# Patient Record
Sex: FEMALE | Race: WHITE | NOT HISPANIC OR LATINO | Employment: OTHER | URBAN - METROPOLITAN AREA
[De-identification: names, ages, dates, MRNs, and addresses within clinical notes are randomized per-mention and may not be internally consistent; named-entity substitution may affect disease eponyms.]

---

## 2020-03-09 ENCOUNTER — TRANSFERRED RECORDS (OUTPATIENT)
Dept: HEALTH INFORMATION MANAGEMENT | Facility: CLINIC | Age: 50
End: 2020-03-09

## 2020-03-11 ENCOUNTER — TRANSFERRED RECORDS (OUTPATIENT)
Dept: HEALTH INFORMATION MANAGEMENT | Facility: CLINIC | Age: 50
End: 2020-03-11

## 2020-03-24 ENCOUNTER — TRANSFERRED RECORDS (OUTPATIENT)
Dept: HEALTH INFORMATION MANAGEMENT | Facility: CLINIC | Age: 50
End: 2020-03-24

## 2020-03-24 LAB
ALT SERPL-CCNC: 20 U/L (ref 7–40)
AST SERPL-CCNC: 23 U/L (ref 13–40)
CREAT SERPL-MCNC: 0.82 MG/DL (ref 0.5–1.2)
GFR SERPL CREATININE-BSD FRML MDRD: 83.2 ML/MIN/1.73M2
GLUCOSE SERPL-MCNC: 90 MG/DL (ref 73–126)
POTASSIUM SERPL-SCNC: 4.1 MMOL/L (ref 3.5–5.1)

## 2020-03-27 ENCOUNTER — TRANSFERRED RECORDS (OUTPATIENT)
Dept: HEALTH INFORMATION MANAGEMENT | Facility: CLINIC | Age: 50
End: 2020-03-27

## 2020-03-31 ENCOUNTER — TRANSFERRED RECORDS (OUTPATIENT)
Dept: HEALTH INFORMATION MANAGEMENT | Facility: CLINIC | Age: 50
End: 2020-03-31

## 2020-04-01 ENCOUNTER — TRANSFERRED RECORDS (OUTPATIENT)
Dept: HEALTH INFORMATION MANAGEMENT | Facility: CLINIC | Age: 50
End: 2020-04-01

## 2020-04-20 ENCOUNTER — PATIENT OUTREACH (OUTPATIENT)
Dept: RADIATION ONCOLOGY | Facility: CLINIC | Age: 50
End: 2020-04-20

## 2020-04-20 NOTE — PROGRESS NOTES
Patient contacted by this RN, screening for COVID-19 completed as patient scheduled for radiation oncology consultation with Dr. Ruth on Tuesday, 4/21/2020 at 1330.  Patient informed of visitor restrictions and patient verbalized understanding.  Reviewed appointment date, time, clinic location and check-in desk location.  Patient had no questions at this time    Marianne Briseno RN BSN OCN

## 2020-04-21 ENCOUNTER — APPOINTMENT (OUTPATIENT)
Dept: RADIATION ONCOLOGY | Facility: CLINIC | Age: 50
End: 2020-04-21
Payer: COMMERCIAL

## 2020-04-21 ENCOUNTER — OFFICE VISIT (OUTPATIENT)
Dept: RADIATION ONCOLOGY | Facility: CLINIC | Age: 50
End: 2020-04-21
Payer: COMMERCIAL

## 2020-04-21 VITALS
DIASTOLIC BLOOD PRESSURE: 87 MMHG | HEART RATE: 73 BPM | SYSTOLIC BLOOD PRESSURE: 126 MMHG | OXYGEN SATURATION: 98 % | WEIGHT: 134.4 LBS | TEMPERATURE: 98 F | RESPIRATION RATE: 18 BRPM

## 2020-04-21 DIAGNOSIS — C50.111 MALIGNANT NEOPLASM OF CENTRAL PORTION OF RIGHT BREAST IN FEMALE, ESTROGEN RECEPTOR POSITIVE (H): Primary | ICD-10-CM

## 2020-04-21 DIAGNOSIS — Z17.0 MALIGNANT NEOPLASM OF CENTRAL PORTION OF RIGHT BREAST IN FEMALE, ESTROGEN RECEPTOR POSITIVE (H): Primary | ICD-10-CM

## 2020-04-21 PROCEDURE — 99205 OFFICE O/P NEW HI 60 MIN: CPT | Performed by: RADIOLOGY

## 2020-04-21 PROCEDURE — 77263 THER RADIOLOGY TX PLNG CPLX: CPT | Performed by: RADIOLOGY

## 2020-04-21 PROCEDURE — 77290 THER RAD SIMULAJ FIELD CPLX: CPT | Performed by: RADIOLOGY

## 2020-04-21 RX ORDER — TRAZODONE HYDROCHLORIDE 50 MG/1
50 TABLET, FILM COATED ORAL
COMMUNITY
Start: 2020-03-13

## 2020-04-21 RX ORDER — FLUOXETINE 20 MG/1
20 TABLET, FILM COATED ORAL DAILY
COMMUNITY
Start: 2020-03-13

## 2020-04-21 RX ORDER — TAMOXIFEN CITRATE 10 MG/1
TABLET ORAL DAILY
COMMUNITY
Start: 2020-04-04

## 2020-04-21 ASSESSMENT — PAIN SCALES - GENERAL: PAINLEVEL: MILD PAIN (2)

## 2020-04-21 NOTE — NURSING NOTE
INITIAL PATIENT ASSESSMENT      Diagnosis: Breast cancer (right breast)    Prior radiation therapy: None    Prior chemotherapy: None    Prior hormonal therapy: Patient reports she has had Mirena IUD for six years, she reports she is looking to have this removed as Dr. Arango has informed her that she is post-menopausal.  Patient reports history of oral birth control use for 22 years, denies history of HRT.      Patient reports she is currently taking Tamoxifen po daily as prescribed by Dr. Arango on 3/31/2020.    Pain Eval:  Current history of pain associated with this visit:   Intensity: 2/10  Current: discomfort  Location: right breast nerve pains with intermittent radiation of pain to left breast  Treatment: denies need for medication management    Psychosocial  Living arrangements: Lives at home in [place]  Fall Risk: independent  [facility] Falls Risk Screening Completed: Yes Result: Negative   referral needs: Not needed    Advanced Directive: No, patient reports she has information packet at home  Implantable Cardiac Device: No  Authorization To Share Protected Health Information: YES - Date: 4/21/2020    Onset of menarche: age 13  Onset of menopause: patient reports she currently has IUD, labs drawn per Dr. Arango and patient reports she was informed by Dr. Arango that she is post-menopausal by labs.  Abnormal vaginal bleeding/discharge: No  Are you pregnant? No  Reproductive note: Patient reports history of two pregnancies with two live births, first at age 28, denies history of breastfeeding.  Urine Pregnancy Testing Needed: No - reviewed with Dr. Ruth.      Review of Systems     Constitutional: Positive for diaphoresis. Negative for chills, fever, malaise/fatigue and weight loss.        Patient reports intermittent night sweats.   HENT: Negative.    Eyes: Negative.    Respiratory: Negative.    Cardiovascular: Negative.    Gastrointestinal: Negative.    Genitourinary: Negative.    Musculoskeletal:  Negative.    Skin: Negative.    Neurological: Negative.    Endo/Heme/Allergies: Negative.    Psychiatric/Behavioral: Negative.      Nurse face-to-face time: Level 5:  over 15 min face to face time

## 2020-04-21 NOTE — PROGRESS NOTES
"Problem: Patient Care Overview  Goal: Plan of Care/Patient Progress Review    Pt is calm with mildly bland affect. She states her mood is \"kind of in between-neutral.\" She denies si; rates her depression 4, anxiety 2. She said she is \"having to contain my energy,\" when talked about manic sx. She reports she is having \"a little bit\" of racing thoughts. Pt states her goal for the day is \"to go to all groups and get my assignments done.\" She said she has been \"pretty social\" w peers.      " Dear Colleagues,  Today Cece Roth was seen in consultation.  IDENTIFICATION: This is a 49 year old postmenopausal woman with right breast cancer status post lumpectomy and SLNBx, revealing microinvasive IDCA, [id number], ER+/RI+/H2N+, disease referred for adjuvant radiation therapy. She will not be receiving chemotherapy.  HISTORY OF PRESENT ILLNESS: Cece Roth was in her usual state of health this past year.  On March 11, 2020 bilateral screening mammogram showed within the right breast 12 o'clock position indeterminate microcalcifications spanning 4 cm.  There was no suspicious finding on the left.  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  On March 27, 2020 Dr. John took Ms. Roth to the OR for a right breast wire-localized lumpectomy and sentinel lymph node biopsy.  Pathology revealed 2.4 cm of grade 3 DCIS and no invasive malignancy. Initially the anterior margin was 1 mm but this was re-excised and the new anterior medial margin revealed no atypia in situ or invasive carcinoma. Noninvasive margins were adequate. Invasive margins were indeterminate as it was only present in biopsy specimen.  There was 0 out of 1 positive lymph node removed.  Specimen radiograph was completed. The specimen contained the distal localization wires and the calcifications. The calcifications were at the edge of the specimen therefore slicing was performed. Calcifications were seen within specimen 3 (12-1 o'clock) and specimen 4 (11-12 o'clock). The clip was not in the specimen, however was displaced from the site of biopsy. Her postoperative course has been unenventful.  Dr. Arango is her Medical Oncologist and she was initially seen prior to surgery on March 24, 2020.  After surgery she was started on Tamoxifen on March 31, 2020.  Since her  surgery, she has continued to heal well and denies any significant pain requiring pain medication.  She has good ROM.  She denies any other new masses, skin changes, shortness of breath, chest or bony pain, or new neurologic symptoms. She is being seen here today for consideration of postoperative radiotherapy.  REVIEW OF SYSTEMS: As per HPI, a 14-point review of system is otherwise negative.  PAST RADIATION THERAPY:  Denies.  PAST CTD/PACEMAKER: Denies  BREAST RISK FACTORS:  No history of prior breast biopsy. No family history of breast or ovarian cancer. She started her menses at age 13.   with her first delivery at age 28. She  for a total of 0 months. Postmenopausal as of this month with lab draws on file.  History of IUD x 6 years.  History of OCP use x 22 years.    Past Medical History:   Diagnosis Date     Breast cancer (H) 2020    Right Breast       Past Surgical History:   Procedure Laterality Date     BREAST BIOPSY, CORE RT/LT Right 2020     LUMPECTOMY BREAST WITH SENTINEL NODE, COMBINED Right 2020    Dr. John       Family History   Problem Relation Age of Onset     Breast Cancer No family hx of      Ovarian Cancer No family hx of        Social History     Tobacco Use     Smoking status: Never Smoker     Smokeless tobacco: Never Used   Substance Use Topics     Alcohol use: Yes     Comment: Two glasses of wine per day per patient report     Current Outpatient Medications   Medication     FLUoxetine 20 MG tablet     tamoxifen (NOLVADEX) 10 MG tablet     traZODone (DESYREL) 50 MG tablet     No current facility-administered medications for this visit.       No Known Allergies  PHYSICAL EXAMINATION:  /87 (BP Location: Left arm, Patient Position: Chair, Cuff Size: Adult Regular)   Pulse 73   Temp 98  F (36.7  C) (Oral)   Resp 18   Wt 61 kg (134 lb 6.4 oz)   SpO2 98%   GENERAL Well-appearing woman in no acute distress.  HEENT Normocephalic, atraumatic.  Sclerae  anicteric.  CVR  Regular rate and rhythm.  No murmurs, rubs, or gallops.  LUNGS Clear to auscultation bilaterally.  BREASTS Breasts are examined in the supine and upright position.  The breasts are symmetric.  The left breast is unremarkable, as there is no other erythema, ulceration or suspicious nodularity within it.  Within the right upper breast and right axilla there are two well healed incisions.  Firmness of these incisions is not suspicious.  There is no suspicious erythema, ulceration or nodularity within the right breast.  There is no erythema, retraction, desquamation or discharge appreciated within the right or left nipple areolar complex.    LYMPH No supraclavicular, infraclavicular, or axillary lymphadenopathy appreciated bilaterally.  ABDOMEN Soft.    EXT  No clubbing or cyanosis or edema.    NEURO No focal deficits.  MSK  Good ROM. No spinal tenderness.  SKIN  Warm and well perfused.    PSYCH  Alert and oriented x 3    ECOG PERFORMANCE STATUS: 0    IMPRESSION/PLAN: Cece Roth is a 49 year old postmenopausal woman with right breast cancer status post lumpectomy and SLNBx, revealing microinvasive IDCA, [id number], ER+/NM+/H2N+, disease referred for adjuvant radiation therapy. She will not be receiving chemotherapy.  I recommend adjuvant XRT to improve local control and overall survival.  Plan is to treat the affected breast based on multiple randomized prospective data which show decrease risk of local recurrence by ~50% with the addition of XRT to BCS and the EBCTCG metaanalysis published in Lancet 2011 which shows that for every 4 recurrences avoided by year 10 one breast cancer death is avoided by year 15.  Given her age she will also receive a tumor cavity boost based EORTC and [employer] Boost Trials.  The risks, benefits, treatment rationale and regimen of radiation therapy to the breast were discussed in great detail today with the patient.  Risks include but are not limited to skin  "erythema, desquamation, hyperpigmentation, breast and lymphedema, fibrosis, telengectasia, pneumonitis, cardiac toxicity, rib fracture and secondary malignancy. The patient consented to therapy and is scheduled for a CT simulation. Treatment will start in ~1 week.    Of note, today during her visit we also discussed an alternative treatment schedule/plan given the COVID pandemic.  This would entail delaying adjuvant XRT while on Tamoxifen, until the pandemic subsides. She is Her2+ but had microinvasion.  She also has G3 DCIS excised with adequate margins.  Patient expressed a great deal of anxiety today and stated that she would prefer to have her radiation treatment completed as soon as possible.  I think this is reasonable given her recurrence risk, lack of comorbidities and ambiguity of when COVID will be at a \"safe\" level within the community. We discussed taking precautions while under treatment.  She arrived today not wearing a mask but stated that she would get one before starting treatment.  She already has been very diligent about decreasing her exposure within the community.  Additional problem list to be addressed in the following manner:  1. Systemic Treatment in Postmenopausal Woman: No chemo.  Currently on Tamoxifen.  Will stop taking during radiation treatment and restart it 2 weeks after XRT completed.  Dr. Arango aware.   There was ample time for questions and all were answered to the patient's satisfaction. Thank you for allowing me to participate in the care of this pleasant patient. If you have any questions, please do not hesitate to contact my office.    Sincerely,  Courtney Ruth MD          "

## 2020-04-21 NOTE — PATIENT INSTRUCTIONS
Please contact [facility] RN with questions or concerns following today's appointment: [phone].    Thank you!

## 2020-04-23 ENCOUNTER — TRANSFERRED RECORDS (OUTPATIENT)
Dept: HEALTH INFORMATION MANAGEMENT | Facility: CLINIC | Age: 50
End: 2020-04-23

## 2020-04-24 ENCOUNTER — APPOINTMENT (OUTPATIENT)
Dept: RADIATION ONCOLOGY | Facility: CLINIC | Age: 50
End: 2020-04-24
Payer: COMMERCIAL

## 2020-04-24 PROCEDURE — 77334 RADIATION TREATMENT AID(S): CPT | Performed by: RADIOLOGY

## 2020-04-24 PROCEDURE — 77300 RADIATION THERAPY DOSE PLAN: CPT | Performed by: RADIOLOGY

## 2020-04-24 PROCEDURE — 77295 3-D RADIOTHERAPY PLAN: CPT | Performed by: RADIOLOGY

## 2020-04-28 ENCOUNTER — ANCILLARY PROCEDURE (OUTPATIENT)
Dept: MAMMOGRAPHY | Facility: CLINIC | Age: 50
End: 2020-04-28
Attending: RADIOLOGY
Payer: COMMERCIAL

## 2020-04-28 ENCOUNTER — PATIENT OUTREACH (OUTPATIENT)
Dept: RADIATION ONCOLOGY | Facility: CLINIC | Age: 50
End: 2020-04-28

## 2020-04-28 DIAGNOSIS — Z17.0 MALIGNANT NEOPLASM OF CENTRAL PORTION OF RIGHT BREAST IN FEMALE, ESTROGEN RECEPTOR POSITIVE (H): ICD-10-CM

## 2020-04-28 DIAGNOSIS — C50.111 MALIGNANT NEOPLASM OF CENTRAL PORTION OF RIGHT BREAST IN FEMALE, ESTROGEN RECEPTOR POSITIVE (H): ICD-10-CM

## 2020-04-28 PROCEDURE — 77065 DX MAMMO INCL CAD UNI: CPT | Performed by: RADIOLOGY

## 2020-04-28 PROCEDURE — G0279 TOMOSYNTHESIS, MAMMO: HCPCS | Performed by: RADIOLOGY

## 2020-04-28 NOTE — TELEPHONE ENCOUNTER
Patient had mammogram today, 4/28/2020 as ordered by Dr. Ruth.  Request from Dr. Ruth to notify patient of results and continue with radiation treatment start as planned.  This RN contacted patient, notified of above information and confirmed appointment for verification sim for radiation treatment tomorrow, 4/29/2020 at 1200.    Patient verbalized understanding of all information and had no questions at this time.    Marianne Briseno RN BSN OCN

## 2020-04-29 ENCOUNTER — APPOINTMENT (OUTPATIENT)
Dept: RADIATION ONCOLOGY | Facility: CLINIC | Age: 50
End: 2020-04-29
Payer: COMMERCIAL

## 2020-04-29 PROCEDURE — 77280 THER RAD SIMULAJ FIELD SMPL: CPT | Performed by: RADIOLOGY

## 2020-04-30 ENCOUNTER — APPOINTMENT (OUTPATIENT)
Dept: RADIATION ONCOLOGY | Facility: CLINIC | Age: 50
End: 2020-04-30
Payer: COMMERCIAL

## 2020-04-30 PROCEDURE — G6012 RADIATION TREATMENT DELIVERY: HCPCS | Performed by: RADIOLOGY

## 2020-05-01 ENCOUNTER — APPOINTMENT (OUTPATIENT)
Dept: RADIATION ONCOLOGY | Facility: CLINIC | Age: 50
End: 2020-05-01
Payer: COMMERCIAL

## 2020-05-01 PROCEDURE — 77417 THER RADIOLOGY PORT IMAGE(S): CPT | Performed by: RADIOLOGY

## 2020-05-01 PROCEDURE — G6012 RADIATION TREATMENT DELIVERY: HCPCS | Performed by: RADIOLOGY

## 2020-05-04 ENCOUNTER — APPOINTMENT (OUTPATIENT)
Dept: RADIATION ONCOLOGY | Facility: CLINIC | Age: 50
End: 2020-05-04
Payer: COMMERCIAL

## 2020-05-04 PROCEDURE — G6012 RADIATION TREATMENT DELIVERY: HCPCS | Performed by: RADIOLOGY

## 2020-05-05 ENCOUNTER — APPOINTMENT (OUTPATIENT)
Dept: RADIATION ONCOLOGY | Facility: CLINIC | Age: 50
End: 2020-05-05
Payer: COMMERCIAL

## 2020-05-05 PROCEDURE — G6012 RADIATION TREATMENT DELIVERY: HCPCS | Performed by: RADIOLOGY

## 2020-05-06 ENCOUNTER — OFFICE VISIT (OUTPATIENT)
Dept: RADIATION ONCOLOGY | Facility: CLINIC | Age: 50
End: 2020-05-06
Payer: COMMERCIAL

## 2020-05-06 ENCOUNTER — APPOINTMENT (OUTPATIENT)
Dept: RADIATION ONCOLOGY | Facility: CLINIC | Age: 50
End: 2020-05-06
Payer: COMMERCIAL

## 2020-05-06 VITALS
WEIGHT: 134 LBS | OXYGEN SATURATION: 98 % | SYSTOLIC BLOOD PRESSURE: 119 MMHG | TEMPERATURE: 97 F | DIASTOLIC BLOOD PRESSURE: 83 MMHG | HEART RATE: 68 BPM | RESPIRATION RATE: 18 BRPM

## 2020-05-06 DIAGNOSIS — C50.111 MALIGNANT NEOPLASM OF CENTRAL PORTION OF RIGHT BREAST IN FEMALE, ESTROGEN RECEPTOR POSITIVE (H): Primary | ICD-10-CM

## 2020-05-06 DIAGNOSIS — Z17.0 MALIGNANT NEOPLASM OF CENTRAL PORTION OF RIGHT BREAST IN FEMALE, ESTROGEN RECEPTOR POSITIVE (H): Primary | ICD-10-CM

## 2020-05-06 PROCEDURE — 99207 ZZC DROP WITH A PROCEDURE: CPT | Performed by: RADIOLOGY

## 2020-05-06 PROCEDURE — 77412 RADIATION TX DELIVERY LVL 3: CPT | Performed by: RADIOLOGY

## 2020-05-06 PROCEDURE — 77427 RADIATION TX MANAGEMENT X5: CPT | Performed by: RADIOLOGY

## 2020-05-06 PROCEDURE — 77336 RADIATION PHYSICS CONSULT: CPT | Performed by: RADIOLOGY

## 2020-05-06 ASSESSMENT — PAIN SCALES - GENERAL: PAINLEVEL: NO PAIN (0)

## 2020-05-06 NOTE — PATIENT INSTRUCTIONS
Please contact [facility] RN with questions or concerns following today's appointment: [phone].    Thank you!

## 2020-05-06 NOTE — NURSING NOTE
Teaching Flowsheet   Relevant Diagnosis: breast cancer  Teaching Topic: radiation therapy     Person(s) involved in teaching:   Patient     Motivation Level:  Asks Questions: Yes  Eager to Learn: Yes  Cooperative: Yes  Receptive (willing/able to accept information): Yes  Any cultural factors/Rastafari beliefs that may influence understanding or compliance? No       Patient demonstrates understanding of the following:  Reason for the appointment, diagnosis and treatment plan: Yes  Knowledge of proper use of medications and conditions for which they are ordered (with special attention to potential side effects or drug interactions): Yes  Which situations necessitate calling provider and whom to contact: Yes       Teaching Concerns Addressed:   Comments: Radiation therapy side effects: fatigue, skin changes and skin cares     Proper use and care of  (medical equip, care aids, etc.): NA  Nutritional needs and diet plan: Yes  Pain management techniques: Yes  Wound Care: Yes  How and/when to access community resources: Yes     Instructional Materials Used/Given: Radiation therapy educational handouts: fatigue, skin changes and skin cares     Time spent with patient: 15 minutes.

## 2020-05-06 NOTE — PROGRESS NOTES
[facility] PHYSICIANS  SPECIALIZING IN BREAKTHROUGHS  Radiation Oncology    On Treatment Visit Note      Cece Roth      Date: 2020   MRN: [id number]   : 1970  Diagnosis: breast cancer      Reason for Visit:  On Radiation Treatment Visit     Treatment Summary to Date  Treatment Site: right breast Current Dose: 1330/5256 cGy Fractions:       Chemotherapy  Chemo concurrent with radx?: No(Dr. Arango)    Subjective:     Early in treatment.  Doing well with no complaints.    Nursing ROS:   Nutrition Alteration  Diet Type: Patient's Preference  Skin  Skin Reaction: 0 - No changes  Skin Intervention: skin changes and skin cares reviewed, Aquaphor and Vanicream samples provided        Cardiovascular  Respiratory effort: 1 - Normal - without distress        Psychosocial  Mood - Anxiety: 0 - Normal  Mood - Depression: 0 - Normal  Pyschosocial Note: energy level at baseline  Pain Assessment  0-10 Pain Scale: 0      Objective:   /83 (BP Location: Left arm, Patient Position: Chair, Cuff Size: Adult Regular)   Pulse 68   Temp 97  F (36.1  C) (Oral)   Resp 18   Wt 60.8 kg (134 lb)   SpO2 98%   Gen: Appears well, in no acute distress  Skin: No erythema    Labs:  CBC RESULTS: No results for input(s): WBC, RBC, HGB, HCT, MCV, MCH, MCHC, RDW, PLT in the last 84209 hours.  ELECTROLYTES:  Recent Labs   Lab Test 20   POTASSIUM 4.1   CR 0.82   GLC 90       Assessment:    Tolerating radiation therapy well.  All questions and concerns addressed.    No toxicity    Plan:   1. Continue current therapy.    2. Skin care per above.      Mosaiq chart and setup information reviewed  Ports checked    Medication Review  Med list reviewed with patient?: Yes  Med list printed and given: Offered and declined    Educational Topic Discussed  Education Instructions: radiation therapy side effects: fatigue, skin changes and skin cares        Courtney Ruth MD    Please do not send letter to referring  physician.

## 2020-05-07 ENCOUNTER — APPOINTMENT (OUTPATIENT)
Dept: RADIATION ONCOLOGY | Facility: CLINIC | Age: 50
End: 2020-05-07
Payer: COMMERCIAL

## 2020-05-07 PROCEDURE — 77307 TELETHX ISODOSE PLAN CPLX: CPT | Performed by: RADIOLOGY

## 2020-05-07 PROCEDURE — 77334 RADIATION TREATMENT AID(S): CPT | Performed by: RADIOLOGY

## 2020-05-07 PROCEDURE — 77412 RADIATION TX DELIVERY LVL 3: CPT | Performed by: RADIOLOGY

## 2020-05-08 ENCOUNTER — APPOINTMENT (OUTPATIENT)
Dept: RADIATION ONCOLOGY | Facility: CLINIC | Age: 50
End: 2020-05-08
Payer: COMMERCIAL

## 2020-05-08 PROCEDURE — G6012 RADIATION TREATMENT DELIVERY: HCPCS | Performed by: RADIOLOGY

## 2020-05-08 PROCEDURE — 77417 THER RADIOLOGY PORT IMAGE(S): CPT | Performed by: RADIOLOGY

## 2020-05-11 ENCOUNTER — APPOINTMENT (OUTPATIENT)
Dept: RADIATION ONCOLOGY | Facility: CLINIC | Age: 50
End: 2020-05-11
Payer: COMMERCIAL

## 2020-05-11 PROCEDURE — G6012 RADIATION TREATMENT DELIVERY: HCPCS | Performed by: RADIOLOGY

## 2020-05-12 ENCOUNTER — APPOINTMENT (OUTPATIENT)
Dept: RADIATION ONCOLOGY | Facility: CLINIC | Age: 50
End: 2020-05-12
Payer: COMMERCIAL

## 2020-05-12 PROCEDURE — G6012 RADIATION TREATMENT DELIVERY: HCPCS | Performed by: RADIOLOGY

## 2020-05-13 ENCOUNTER — APPOINTMENT (OUTPATIENT)
Dept: RADIATION ONCOLOGY | Facility: CLINIC | Age: 50
End: 2020-05-13
Payer: COMMERCIAL

## 2020-05-13 ENCOUNTER — OFFICE VISIT (OUTPATIENT)
Dept: RADIATION ONCOLOGY | Facility: CLINIC | Age: 50
End: 2020-05-13
Payer: COMMERCIAL

## 2020-05-13 VITALS — RESPIRATION RATE: 16 BRPM | OXYGEN SATURATION: 97 % | TEMPERATURE: 97 F | HEART RATE: 83 BPM | WEIGHT: 134.2 LBS

## 2020-05-13 DIAGNOSIS — C50.111 MALIGNANT NEOPLASM OF CENTRAL PORTION OF RIGHT BREAST IN FEMALE, ESTROGEN RECEPTOR POSITIVE (H): Primary | ICD-10-CM

## 2020-05-13 DIAGNOSIS — Z17.0 MALIGNANT NEOPLASM OF CENTRAL PORTION OF RIGHT BREAST IN FEMALE, ESTROGEN RECEPTOR POSITIVE (H): Primary | ICD-10-CM

## 2020-05-13 PROCEDURE — 77412 RADIATION TX DELIVERY LVL 3: CPT | Performed by: RADIOLOGY

## 2020-05-13 PROCEDURE — 77427 RADIATION TX MANAGEMENT X5: CPT | Performed by: RADIOLOGY

## 2020-05-13 PROCEDURE — 77336 RADIATION PHYSICS CONSULT: CPT | Performed by: RADIOLOGY

## 2020-05-13 PROCEDURE — 99207 ZZC DROP WITH A PROCEDURE: CPT | Performed by: RADIOLOGY

## 2020-05-13 ASSESSMENT — PAIN SCALES - GENERAL: PAINLEVEL: NO PAIN (0)

## 2020-05-13 NOTE — PROGRESS NOTES
[facility] PHYSICIANS  SPECIALIZING IN BREAKTHROUGHS  Radiation Oncology    On Treatment Visit Note      Cece Roth      Date: 2020   MRN: [id number]   : 1970  Diagnosis: breast cancer      Reason for Visit:  On Radiation Treatment Visit     Treatment Summary to Date  Treatment Site: right breast Current Dose: 2660/5256 cGy Fractions: 10/20      Chemotherapy  Chemo concurrent with radx?: No(Dr. Arango)    Subjective:     More skin redness this week.    Nursing ROS:   Nutrition Alteration  Diet Type: Patient's Preference  Skin  Skin Reaction: 1 - Faint erythema or dry desquamation(no desquamation)  Skin Intervention: patient using Vanicream and Aquaphor every other day  Skin Note: reviewed daily skin cares - recommend use of Aquaphor or Vanicream two times daily        Cardiovascular  Respiratory effort: 1 - Normal - without distress        Psychosocial  Mood - Anxiety: 0 - Normal  Mood - Depression: 0 - Normal  Pyschosocial Note: energy level at baseline  Pain Assessment  0-10 Pain Scale: 0      Objective:   Pulse 83   Temp 97  F (36.1  C) (Oral)   Resp 16   Wt 60.9 kg (134 lb 3.2 oz)   SpO2 97%   Gen: Appears well, in no acute distress  Skin: Mild diffuse erythema over treatment field    Labs:  CBC RESULTS: No results for input(s): WBC, RBC, HGB, HCT, MCV, MCH, MCHC, RDW, PLT in the last 66736 hours.  ELECTROLYTES:  Recent Labs   Lab Test 20   POTASSIUM 4.1   CR 0.82   GLC 90       Assessment:    Tolerating radiation therapy well.  All questions and concerns addressed.    Toxicities:  Fatigue: Grade 0: No toxicity  Dermatitis: Grade 1: Faint erythema or dry desquamation    Plan:   1. Continue current therapy.    2. Skin care per above.      Mosaiq chart and setup information reviewed  Ports checked    Medication Review  Med list reviewed with patient?: Yes  Med list printed and given: Offered and declined    Educational Topic Discussed  Education Instructions: reviewed  continued skin cares        Courtney Ruth MD    Please do not send letter to referring physician.

## 2020-05-13 NOTE — PATIENT INSTRUCTIONS
Please contact [facility] RN with questions or concerns following today's appointment: [phone].    Thank you!

## 2020-05-14 ENCOUNTER — APPOINTMENT (OUTPATIENT)
Dept: RADIATION ONCOLOGY | Facility: CLINIC | Age: 50
End: 2020-05-14
Payer: COMMERCIAL

## 2020-05-14 PROCEDURE — G6012 RADIATION TREATMENT DELIVERY: HCPCS | Performed by: RADIOLOGY

## 2020-05-15 ENCOUNTER — APPOINTMENT (OUTPATIENT)
Dept: RADIATION ONCOLOGY | Facility: CLINIC | Age: 50
End: 2020-05-15
Payer: COMMERCIAL

## 2020-05-15 PROCEDURE — G6012 RADIATION TREATMENT DELIVERY: HCPCS | Performed by: RADIOLOGY

## 2020-05-15 PROCEDURE — 77417 THER RADIOLOGY PORT IMAGE(S): CPT | Performed by: RADIOLOGY

## 2020-05-18 ENCOUNTER — APPOINTMENT (OUTPATIENT)
Dept: RADIATION ONCOLOGY | Facility: CLINIC | Age: 50
End: 2020-05-18
Payer: COMMERCIAL

## 2020-05-18 PROCEDURE — 77412 RADIATION TX DELIVERY LVL 3: CPT | Performed by: RADIOLOGY

## 2020-05-18 PROCEDURE — 77280 THER RAD SIMULAJ FIELD SMPL: CPT | Performed by: RADIOLOGY

## 2020-05-19 ENCOUNTER — APPOINTMENT (OUTPATIENT)
Dept: RADIATION ONCOLOGY | Facility: CLINIC | Age: 50
End: 2020-05-19
Payer: COMMERCIAL

## 2020-05-19 PROCEDURE — G6012 RADIATION TREATMENT DELIVERY: HCPCS | Performed by: RADIOLOGY

## 2020-05-20 ENCOUNTER — APPOINTMENT (OUTPATIENT)
Dept: RADIATION ONCOLOGY | Facility: CLINIC | Age: 50
End: 2020-05-20
Payer: COMMERCIAL

## 2020-05-20 ENCOUNTER — OFFICE VISIT (OUTPATIENT)
Dept: RADIATION ONCOLOGY | Facility: CLINIC | Age: 50
End: 2020-05-20
Payer: COMMERCIAL

## 2020-05-20 VITALS
WEIGHT: 136.7 LBS | DIASTOLIC BLOOD PRESSURE: 77 MMHG | RESPIRATION RATE: 18 BRPM | TEMPERATURE: 97.4 F | HEART RATE: 69 BPM | OXYGEN SATURATION: 98 % | SYSTOLIC BLOOD PRESSURE: 120 MMHG

## 2020-05-20 DIAGNOSIS — Z17.0 MALIGNANT NEOPLASM OF CENTRAL PORTION OF RIGHT BREAST IN FEMALE, ESTROGEN RECEPTOR POSITIVE (H): Primary | ICD-10-CM

## 2020-05-20 DIAGNOSIS — C50.111 MALIGNANT NEOPLASM OF CENTRAL PORTION OF RIGHT BREAST IN FEMALE, ESTROGEN RECEPTOR POSITIVE (H): Primary | ICD-10-CM

## 2020-05-20 PROCEDURE — G6012 RADIATION TREATMENT DELIVERY: HCPCS | Performed by: RADIOLOGY

## 2020-05-20 PROCEDURE — 99207 ZZC DROP WITH A PROCEDURE: CPT | Mod: GC | Performed by: STUDENT IN AN ORGANIZED HEALTH CARE EDUCATION/TRAINING PROGRAM

## 2020-05-20 PROCEDURE — 77336 RADIATION PHYSICS CONSULT: CPT | Performed by: RADIOLOGY

## 2020-05-20 PROCEDURE — 77427 RADIATION TX MANAGEMENT X5: CPT | Performed by: RADIOLOGY

## 2020-05-20 ASSESSMENT — PAIN SCALES - GENERAL: PAINLEVEL: NO PAIN (0)

## 2020-05-20 NOTE — PROGRESS NOTES
[facility] PHYSICIANS  SPECIALIZING IN BREAKTHROUGHS  Radiation Oncology    On Treatment Visit Note      Cece Roth      Date: 2020   MRN: [id number]   : 1970  Diagnosis: breast cancer      Reason for Visit:  On Radiation Treatment Visit     Treatment Summary to Date  Treatment Site: right breast Current Dose: 3990/5256 cGy Fractions: 15/20      Chemotherapy  Chemo concurrent with radx?: No(Dr. Arango)    Subjective: She continues to tolerate her treatment well with no symptoms. She uses Aquaphor infrequently.    Nursing ROS:   Nutrition Alteration  Diet Type: Patient's Preference  Skin  Skin Reaction: 2 - Moderate to brisk erythema, patchy moist desquamation, mostly confined to skin folds and creases, moderate edema(no desquamation)  Skin Intervention: patient using Vanicream and Aquaphor        Cardiovascular  Respiratory effort: 1 - Normal - without distress        Psychosocial  Mood - Anxiety: 0 - Normal  Mood - Depression: 0 - Normal  Pyschosocial Note: energy level at baseline  Pain Assessment  0-10 Pain Scale: 0      Objective:   /77 (BP Location: Left arm, Patient Position: Chair, Cuff Size: Adult Regular)   Pulse 69   Temp 97.4  F (36.3  C) (Oral)   Resp 18   Wt 62 kg (136 lb 11.2 oz)   SpO2 98%   Gen: Appears well, in no acute distress  Skin: Mild erythema over treatment field    Labs:  CBC RESULTS: No results for input(s): WBC, RBC, HGB, HCT, MCV, MCH, MCHC, RDW, PLT in the last 49690 hours.  ELECTROLYTES:  Recent Labs   Lab Test 20   POTASSIUM 4.1   CR 0.82   GLC 90       Assessment:    Tolerating radiation therapy well.  All questions and concerns addressed.    Toxicities:  G2 radiation dermatitis; Moderate to brisk erythema, patchy moist desquamation, mostly confined to skin folds and creases, moderate edema(no desquamation)      Plan:   1. Continue current therapy.    2. Regular use of Vanicream/Aquaphor for skin care      Mosaiq chart and setup  information reviewed  Ports checked    Medication Review  Med list reviewed with patient?: Yes  Med list printed and given: Offered and declined    Educational Topic Discussed  Education Instructions: reviewed continued skin cares    Ld Wilson MD  PGY-3 Resident, Radiation Oncology  [facility]      I have seen and examined the patient and agree with the aforementioned assessment and plan.     Courtney Ruth MD  Medical Director  [facility] Radiation Oncology in [place]

## 2020-05-20 NOTE — LETTER
2020         RE: Cece Roth  [address] [facility] [address]        Dear Colleague,    Thank you for referring your patient, Cece Roth, to the [facility]. Please see a copy of my visit note below.    [facility] PHYSICIANS  SPECIALIZING IN BREAKTHROUGHS  Radiation Oncology    On Treatment Visit Note      Cece Roth      Date: 2020   MRN: [id number]   : 1970  Diagnosis: breast cancer      Reason for Visit:  On Radiation Treatment Visit     Treatment Summary to Date  Treatment Site: right breast Current Dose: 3990/5256 cGy Fractions: 15/20      Chemotherapy  Chemo concurrent with radx?: No(Dr. Arango)    Subjective: She continues to tolerate her treatment well with no symptoms. She uses Aquaphor infrequently.    Nursing ROS:   Nutrition Alteration  Diet Type: Patient's Preference  Skin  Skin Reaction: 2 - Moderate to brisk erythema, patchy moist desquamation, mostly confined to skin folds and creases, moderate edema(no desquamation)  Skin Intervention: patient using Vanicream and Aquaphor        Cardiovascular  Respiratory effort: 1 - Normal - without distress        Psychosocial  Mood - Anxiety: 0 - Normal  Mood - Depression: 0 - Normal  Pyschosocial Note: energy level at baseline  Pain Assessment  0-10 Pain Scale: 0      Objective:   /77 (BP Location: Left arm, Patient Position: Chair, Cuff Size: Adult Regular)   Pulse 69   Temp 97.4  F (36.3  C) (Oral)   Resp 18   Wt 62 kg (136 lb 11.2 oz)   SpO2 98%   Gen: Appears well, in no acute distress  Skin: Mild erythema over treatment field    Labs:  CBC RESULTS: No results for input(s): WBC, RBC, HGB, HCT, MCV, MCH, MCHC, RDW, PLT in the last 85337 hours.  ELECTROLYTES:  Recent Labs   Lab Test 20   POTASSIUM 4.1   CR 0.82   GLC 90       Assessment:    Tolerating radiation therapy well.  All questions and concerns addressed.    Toxicities:  G2 radiation  dermatitis; Moderate to brisk erythema, patchy moist desquamation, mostly confined to skin folds and creases, moderate edema(no desquamation)      Plan:   1. Continue current therapy.    2. Regular use of Vanicream/Aquaphor for skin care      Mosaiq chart and setup information reviewed  Ports checked    Medication Review  Med list reviewed with patient?: Yes  Med list printed and given: Offered and declined    Educational Topic Discussed  Education Instructions: reviewed continued skin cares    Ld Wilson MD  PGY-3 Resident, Radiation Oncology  [facility]      I have seen and examined the patient and agree with the aforementioned assessment and plan.     Courtney Ruth MD  Medical Director  [facility] Radiation Oncology in [place]     Again, thank you for allowing me to participate in the care of your patient.        Sincerely,        Courtney Ruth MD

## 2020-05-20 NOTE — PATIENT INSTRUCTIONS
Please contact [facility] RN with questions or concerns following today's appointment: [phone].    Thank you!

## 2020-05-21 ENCOUNTER — APPOINTMENT (OUTPATIENT)
Dept: RADIATION ONCOLOGY | Facility: CLINIC | Age: 50
End: 2020-05-21
Payer: COMMERCIAL

## 2020-05-21 PROCEDURE — G6012 RADIATION TREATMENT DELIVERY: HCPCS | Performed by: RADIOLOGY

## 2020-05-22 ENCOUNTER — APPOINTMENT (OUTPATIENT)
Dept: RADIATION ONCOLOGY | Facility: CLINIC | Age: 50
End: 2020-05-22
Payer: COMMERCIAL

## 2020-05-22 PROCEDURE — 77412 RADIATION TX DELIVERY LVL 3: CPT | Performed by: RADIOLOGY

## 2020-05-26 ENCOUNTER — APPOINTMENT (OUTPATIENT)
Dept: RADIATION ONCOLOGY | Facility: CLINIC | Age: 50
End: 2020-05-26
Payer: COMMERCIAL

## 2020-05-26 PROCEDURE — G6012 RADIATION TREATMENT DELIVERY: HCPCS | Performed by: RADIOLOGY

## 2020-05-26 PROCEDURE — 77417 THER RADIOLOGY PORT IMAGE(S): CPT | Performed by: RADIOLOGY

## 2020-05-27 ENCOUNTER — APPOINTMENT (OUTPATIENT)
Dept: RADIATION ONCOLOGY | Facility: CLINIC | Age: 50
End: 2020-05-27
Payer: COMMERCIAL

## 2020-05-27 ENCOUNTER — OFFICE VISIT (OUTPATIENT)
Dept: RADIATION ONCOLOGY | Facility: CLINIC | Age: 50
End: 2020-05-27
Payer: COMMERCIAL

## 2020-05-27 VITALS
HEART RATE: 73 BPM | RESPIRATION RATE: 18 BRPM | WEIGHT: 135.2 LBS | OXYGEN SATURATION: 98 % | DIASTOLIC BLOOD PRESSURE: 76 MMHG | SYSTOLIC BLOOD PRESSURE: 113 MMHG | TEMPERATURE: 98.3 F

## 2020-05-27 DIAGNOSIS — Z17.0 MALIGNANT NEOPLASM OF CENTRAL PORTION OF RIGHT BREAST IN FEMALE, ESTROGEN RECEPTOR POSITIVE (H): Primary | ICD-10-CM

## 2020-05-27 DIAGNOSIS — C50.111 MALIGNANT NEOPLASM OF CENTRAL PORTION OF RIGHT BREAST IN FEMALE, ESTROGEN RECEPTOR POSITIVE (H): Primary | ICD-10-CM

## 2020-05-27 PROCEDURE — G6012 RADIATION TREATMENT DELIVERY: HCPCS | Performed by: RADIOLOGY

## 2020-05-27 PROCEDURE — 99207 ZZC DROP WITH A PROCEDURE: CPT | Mod: GC | Performed by: STUDENT IN AN ORGANIZED HEALTH CARE EDUCATION/TRAINING PROGRAM

## 2020-05-27 ASSESSMENT — PAIN SCALES - GENERAL: PAINLEVEL: NO PAIN (0)

## 2020-05-27 NOTE — PROGRESS NOTES
[facility] PHYSICIANS  SPECIALIZING IN BREAKTHROUGHS  Radiation Oncology    On Treatment Visit Note      Cece Roth      Date: 2020   MRN: [id number]   : 1970  Diagnosis: breast cancer      Reason for Visit:  On Radiation Treatment Visit     Treatment Summary to Date  Treatment Site: right breast Current Dose: 5006/5256 cGy Fractions:       Chemotherapy  Chemo concurrent with radx?: No(Dr. Arango)    Subjective:  She continues to tolerate her treatment well with more skin redness this week. She uses Aquaphor.     Nursing ROS:   Nutrition Alteration  Diet Type: Patient's Preference  Skin  Skin Reaction: 2 - Moderate to brisk erythema, patchy moist desquamation, mostly confined to skin folds and creases, moderate edema(no desquamation)  Skin Intervention: patient using Aquaphor  Skin Note: reviewed continued skin cares, midline rash, non pruritic - reviewed use of Hydrocortisone 1% cream if needed        Cardiovascular  Respiratory effort: 1 - Normal - without distress        Psychosocial  Mood - Anxiety: 0 - Normal  Mood - Depression: 0 - Normal  Pyschosocial Note: energy level at baseline  Pain Assessment  0-10 Pain Scale: 0      Objective:   /76 (BP Location: Left arm, Patient Position: Chair, Cuff Size: Adult Regular)   Pulse 73   Temp 98.3  F (36.8  C) (Oral)   Resp 18   Wt 61.3 kg (135 lb 3.2 oz)   SpO2 98%   Gen: Appears well, in no acute distress  Skin: Mild diffuse erythema over treatment field. No skin peeling     Labs:  CBC RESULTS: No results for input(s): WBC, RBC, HGB, HCT, MCV, MCH, MCHC, RDW, PLT in the last 00278 hours.  ELECTROLYTES:  Recent Labs   Lab Test 20   POTASSIUM 4.1   CR 0.82   GLC 90       Assessment:    Tolerating radiation therapy well.  All questions and concerns addressed.    Toxicities:  Radiation dermatitis grade II: Moderate to brisk erythema, patchy moist desquamation, mostly confined to skin folds and creases, moderate  edema(no desquamation)    Plan:   1. Continue current therapy.    2. Continue skin care. Use of hydrocortisone cream over next 2 weeks in addition to Aquaphor.       Mosaiq chart and setup information reviewed  Ports checked    Medication Review  Med list reviewed with patient?: Yes  Med list printed and given: Offered and declined    Educational Topic Discussed  Education Instructions: follow-up with Dr. Ruth on 6/25/2020, follow-up with Dr. Arango July 2020        Ld Wilson MD  PGY-3 Resident, Radiation Oncology  [facility]          I have seen and examined the patient and agree with the aforementioned assessment and plan.     Courtney Ruth MD  Medical Director  [facility] Radiation Oncology in [place]

## 2020-05-27 NOTE — PATIENT INSTRUCTIONS
Please contact [facility] RN with questions or concerns following today's appointment: [phone].    Thank you!

## 2020-05-27 NOTE — LETTER
2020         RE: Cece Roth  [address] [facility] [address]        Dear Colleague,    Thank you for referring your patient, Cece Roth, to the [facility]. Please see a copy of my visit note below.    [facility] PHYSICIANS  SPECIALIZING IN BREAKTHROUGHS  Radiation Oncology    On Treatment Visit Note      Cece Roth      Date: 2020   MRN: [id number]   : 1970  Diagnosis: breast cancer      Reason for Visit:  On Radiation Treatment Visit     Treatment Summary to Date  Treatment Site: right breast Current Dose: 5006/5256 cGy Fractions:       Chemotherapy  Chemo concurrent with radx?: No(Dr. Arango)    Subjective:  She continues to tolerate her treatment well with more skin redness this week. She uses Aquaphor.     Nursing ROS:   Nutrition Alteration  Diet Type: Patient's Preference  Skin  Skin Reaction: 2 - Moderate to brisk erythema, patchy moist desquamation, mostly confined to skin folds and creases, moderate edema(no desquamation)  Skin Intervention: patient using Aquaphor  Skin Note: reviewed continued skin cares, midline rash, non pruritic - reviewed use of Hydrocortisone 1% cream if needed        Cardiovascular  Respiratory effort: 1 - Normal - without distress        Psychosocial  Mood - Anxiety: 0 - Normal  Mood - Depression: 0 - Normal  Pyschosocial Note: energy level at baseline  Pain Assessment  0-10 Pain Scale: 0      Objective:   /76 (BP Location: Left arm, Patient Position: Chair, Cuff Size: Adult Regular)   Pulse 73   Temp 98.3  F (36.8  C) (Oral)   Resp 18   Wt 61.3 kg (135 lb 3.2 oz)   SpO2 98%   Gen: Appears well, in no acute distress  Skin: Mild diffuse erythema over treatment field. No skin peeling     Labs:  CBC RESULTS: No results for input(s): WBC, RBC, HGB, HCT, MCV, MCH, MCHC, RDW, PLT in the last 41931 hours.  ELECTROLYTES:  Recent Labs   Lab Test 20   POTASSIUM 4.1   CR 0.82    GLC 90       Assessment:    Tolerating radiation therapy well.  All questions and concerns addressed.    Toxicities:  Radiation dermatitis grade II: Moderate to brisk erythema, patchy moist desquamation, mostly confined to skin folds and creases, moderate edema(no desquamation)    Plan:   1. Continue current therapy.    2. Continue skin care. Use of hydrocortisone cream over next 2 weeks in addition to Aquaphor.       Mosaiq chart and setup information reviewed  Ports checked    Medication Review  Med list reviewed with patient?: Yes  Med list printed and given: Offered and declined    Educational Topic Discussed  Education Instructions: follow-up with Dr. Ruth on 6/25/2020, follow-up with Dr. Arango July 2020        Ld Wilson MD  PGY-3 Resident, Radiation Oncology  [facility]          I have seen and examined the patient and agree with the aforementioned assessment and plan.     Courtney Ruth MD  Medical Director  [facility] Radiation Oncology in [place]      Again, thank you for allowing me to participate in the care of your patient.        Sincerely,        Courtney Ruth MD

## 2020-05-28 ENCOUNTER — APPOINTMENT (OUTPATIENT)
Dept: RADIATION ONCOLOGY | Facility: CLINIC | Age: 50
End: 2020-05-28
Payer: COMMERCIAL

## 2020-05-28 PROCEDURE — G6012 RADIATION TREATMENT DELIVERY: HCPCS | Performed by: RADIOLOGY

## 2020-05-28 PROCEDURE — 77427 RADIATION TX MANAGEMENT X5: CPT | Performed by: RADIOLOGY

## 2020-05-28 PROCEDURE — 77336 RADIATION PHYSICS CONSULT: CPT | Performed by: RADIOLOGY

## 2020-07-09 ENCOUNTER — VIRTUAL VISIT (OUTPATIENT)
Dept: RADIATION ONCOLOGY | Facility: CLINIC | Age: 50
End: 2020-07-09
Payer: COMMERCIAL

## 2020-07-09 DIAGNOSIS — C50.111 MALIGNANT NEOPLASM OF CENTRAL PORTION OF RIGHT BREAST IN FEMALE, ESTROGEN RECEPTOR POSITIVE (H): Primary | ICD-10-CM

## 2020-07-09 DIAGNOSIS — Z17.0 MALIGNANT NEOPLASM OF CENTRAL PORTION OF RIGHT BREAST IN FEMALE, ESTROGEN RECEPTOR POSITIVE (H): Primary | ICD-10-CM

## 2020-07-09 PROCEDURE — 99024 POSTOP FOLLOW-UP VISIT: CPT | Performed by: RADIOLOGY

## 2020-07-09 ASSESSMENT — PAIN SCALES - GENERAL: PAINLEVEL: NO PAIN (0)

## 2020-07-09 NOTE — PROGRESS NOTES
[facility] PHYSICIANS  SPECIALIZING IN BREAKTHROUGHS  Radiation Oncology        Cece Roth      Date: 2020  MRN: [id number]   : 1970  Diagnosis: breast cancer      Dear Colleagues,    Today Cece Haley was evaluated today by telephone follow-up.  Time of follow-up visit was 5 minutes.    IDENTIFICATION: This is a 49 year old postmenopausal woman with right breast cancer status post lumpectomy and SLNBx, revealing microinvasive IDCA, [id number], ER+/OK+/H2N+, disease referred for adjuvant radiation therapy. She not receive chemotherapy.  She completed hypofractionated radiation therapy to the right breast consisting of 4256 cGy in 16 treatment fractions (266 cGy per fraction), followed by a 1000 cGy boost to the lumpectomy site in 4 treatment fractions (250 cGy per fraction), with radiation therapy completed on 2020.  She is on tamoxifen begun on 2020 per Dr. Arango.  INTERVAL HISTORY: Ms. Roth is doing well 6 weeks following completion of postoperative adjuvant right breast irradiation.  Dr. Arango is her Medical Oncologist and she was initially seen prior to surgery on 2020.  After surgery she was started on Tamoxifen on 2020.  Since her surgery, she has continued to heal well and denies any significant pain requiring pain medication.  She has good ROM.  She denies any other new masses, skin changes, shortness of breath, chest or bony pain, or new neurologic symptoms. She is being seen here today for consideration of postoperative radiotherapy.  REVIEW OF SYSTEMS: As per HPI, a 14-point review of system is otherwise negative.  PAST RADIATION THERAPY:  Denies.  PAST CTD/PACEMAKER: Denies  BREAST RISK FACTORS:  No history of prior breast biopsy. No family history of breast or ovarian cancer. She started her menses at age 13.   with her first delivery at age 28. She  for a total of 0 months. Postmenopausal as of this month  with lab draws on file.  History of IUD x 6 years.  History of OCP use x 22 years.    Past Medical History:   Diagnosis Date     Breast cancer (H) 03/11/2020    Right Breast     S/P radiation therapy     5,256 cGy to right breast completed on 5/28/2020 - [facility]       Past Surgical History:   Procedure Laterality Date     BREAST BIOPSY, CORE RT/LT Right 03/11/2020     LUMPECTOMY BREAST WITH SENTINEL NODE, COMBINED Right 03/27/2020    Dr. John       Family History   Problem Relation Age of Onset     Breast Cancer No family hx of      Ovarian Cancer No family hx of        Social History     Tobacco Use     Smoking status: Never Smoker     Smokeless tobacco: Never Used   Substance Use Topics     Alcohol use: Yes     Comment: Two glasses of wine per day per patient report     Current Outpatient Medications   Medication     FLUoxetine 20 MG tablet     tamoxifen (NOLVADEX) 10 MG tablet     traZODone (DESYREL) 50 MG tablet     No current facility-administered medications for this visit.       No Known Allergies  PHYSICAL EXAMINATION:  There were no vitals taken for this visit.    ECOG PERFORMANCE STATUS: 0    IMPRESSION/PLAN: Cece Roth is a 49 year old postmenopausal woman with right breast cancer status post lumpectomy and SLNBx, revealing microinvasive IDCA, [id number], ER+/FL+/H2N+, disease referred for adjuvant radiation therapy. She will not be receiving chemotherapy.  There was ample time for questions and all were answered to the patient's satisfaction. Thank you for allowing me to participate in the care of this pleasant patient. If you have any questions, please do not hesitate to contact my office.    Sincerely,  Mehdi Bush MD

## 2020-07-09 NOTE — NURSING NOTE
FOLLOW-UP VISIT    Patient Name: Cece Roth      : 1970     Age: 49 year old        ______________________________________________________________________________     Radiation oncology return Telephone Visit with Dr. Bush.    There were no vitals taken for this visit.     Date Radiation Completed: 5,256 cGy to right breast completed on 2020     Pain  Denies at current visit, patient reports intermittent nerve pains of right breast, reviewed with patient, denies need for medication management.    Labs  Other Labs: No    Imaging  None        Other Appointments:     MD Name: Dr. Arango Appointment Date:  per patient report   MD Name: Appointment Date:   MD Name: Appointment Date:       Residual Radiation side effect: Patient reports she is feeling well, denies pain, denies fatigue, denies skin concerns in previous radiation treatment field.  Denies concerns with ROM of right upper extremity, reviewed daily ROM stretching exercises.  Patient reports she is taking Tamoxifen as prescribed by Dr. Arango and recently was seen by Dr. Arango the week of 2020.   Patient reports she was recommended to follow-up in clinic with Dr. Arango in 4-6 months.       Nurse telephone time: Level 3:  10 min nurse telephone time.    Marianne Briseno RN BSN OCN

## 2020-07-09 NOTE — PATIENT INSTRUCTIONS
Please contact [facility] RN with questions or concerns following today's appointment: [phone].    Thank you!

## 2020-07-09 NOTE — PROGRESS NOTES
"Cece Roth is a 49 year old female who is being evaluated via a billable telephone visit.      The patient has been notified of following:     \"This telephone visit will be conducted via a call between you and your physician/provider. We have found that certain health care needs can be provided without the need for a physical exam.  This service lets us provide the care you need with a short phone conversation.  If a prescription is necessary we can send it directly to your pharmacy.  If lab work is needed we can place an order for that and you can then stop by our lab to have the test done at a later time.    Telephone visits are billed at different rates depending on your insurance coverage. During this emergency period, for some insurers they may be billed the same as an in-person visit.  Please reach out to your insurance provider with any questions.    If during the course of the call the physician/provider feels a telephone visit is not appropriate, you will not be charged for this service.\"    Patient has given verbal consent for Telephone visit?  Yes    What phone number would you like to be contacted at? [phone]    How would you like to obtain your AVS? Patient declines    Marianne Briseno RN BSN OCN        "

## 2020-07-13 ENCOUNTER — PATIENT OUTREACH (OUTPATIENT)
Dept: RADIATION ONCOLOGY | Facility: CLINIC | Age: 50
End: 2020-07-13

## 2020-07-13 NOTE — TELEPHONE ENCOUNTER
Received voice message from patient from Thursday evening, 7/9/2020.  Patient reports that she does not need a follow up appointment with Dr. Ruth in six months as recommended after talking speaking with Dr. Bush on Thursday for telephone visit.  Patient reports all questions were answered and she does not want to be charged for another visit.    Marianne Briseno RN BSN OCN

## 2020-08-04 ENCOUNTER — ONCOLOGY VISIT (OUTPATIENT)
Dept: RADIATION ONCOLOGY | Facility: CLINIC | Age: 50
End: 2020-08-04

## 2020-08-17 NOTE — PROCEDURES
Radiotherapy Treatment Summary          Date of Report: May 28, 2020     PATIENT: BRICE ROTH  MEDICAL RECORD NO: [id number]  : 1970     DIAGNOSIS: C50.111 Malignant neoplasm of central portion of right female breast  INTENT OF RADIOTHERAPY: Cure  PATHOLOGY: IDC                               STAGE:   [id number]                        Details of the treatments summarized below are found in records kept in the Department of Radiation Oncology at [facility].     Treatment Summary:  Radiation Oncology - Course: 1 Protocol:   Treatment Site    Current Dose Modality From  To Elapsed Days Fx.  1 Rt Breast     4,256 cGy  10 X   4/30/2020  2020  21 16  1 Rt Breast Bst   1,000 cGy  6X/10X  5/22/2020  2020   6  4                 Dose per Fraction:        Total Dose:    Rt breast                            266 cGy                                                           4256 cGy  Rt breast boost                  250  cGy                                                          5256 cGy            COMMENTS:  Ms. Roth is a 49 year old postmenopausal woman with diagnosis of early right breast   cancer status post lumpectomy and SLNBx, revealing microinvasive IDCA, [id number], ER+/AK+/H2N+.   She was referred for adjuvant radiation therapy. She received 4256 cGy in 16 fractions (266 cGy per fraction) to   the whole right breast, followed by a 1000 cGy in 4 fractions (250 cGy pr fraction) boost to the lumpectomy   cavity. Patient experienced grade 2 skin reaction managed well with moisturizer.      PAIN MANAGEMENT:      Patient did not require any pain medications.                          FOLLOW UP PLAN:  Patient will follow up in Radiation Oncology on 2020 or sooner if any concerns. Patient has an   appointment with medical oncologist, Dr. Arango in 2020.                  Resident: Ld Wilson MD  Staff Physician: Courtney Ruth M.D.  Physicist: Tricia Kuo MS     CC:                Radiation Oncology [address], [place] [address] Phone: [phone]

## 2020-11-07 ENCOUNTER — HEALTH MAINTENANCE LETTER (OUTPATIENT)
Age: 50
End: 2020-11-07

## 2021-07-11 ENCOUNTER — HEALTH MAINTENANCE LETTER (OUTPATIENT)
Age: 51
End: 2021-07-11

## 2021-09-05 ENCOUNTER — HEALTH MAINTENANCE LETTER (OUTPATIENT)
Age: 51
End: 2021-09-05

## 2021-12-26 ENCOUNTER — HEALTH MAINTENANCE LETTER (OUTPATIENT)
Age: 51
End: 2021-12-26

## 2022-08-07 ENCOUNTER — HEALTH MAINTENANCE LETTER (OUTPATIENT)
Age: 52
End: 2022-08-07

## 2022-10-23 ENCOUNTER — HEALTH MAINTENANCE LETTER (OUTPATIENT)
Age: 52
End: 2022-10-23

## 2023-04-02 ENCOUNTER — HEALTH MAINTENANCE LETTER (OUTPATIENT)
Age: 53
End: 2023-04-02

## 2023-09-02 ENCOUNTER — HEALTH MAINTENANCE LETTER (OUTPATIENT)
Age: 53
End: 2023-09-02